# Patient Record
Sex: FEMALE | Race: WHITE | Employment: UNEMPLOYED | URBAN - METROPOLITAN AREA
[De-identification: names, ages, dates, MRNs, and addresses within clinical notes are randomized per-mention and may not be internally consistent; named-entity substitution may affect disease eponyms.]

---

## 2017-01-16 ENCOUNTER — NURSE ONLY (OUTPATIENT)
Dept: FAMILY MEDICINE CLINIC | Facility: CLINIC | Age: 52
End: 2017-01-16

## 2017-01-16 VITALS
SYSTOLIC BLOOD PRESSURE: 132 MMHG | OXYGEN SATURATION: 98 % | TEMPERATURE: 98 F | WEIGHT: 194 LBS | RESPIRATION RATE: 20 BRPM | BODY MASS INDEX: 39 KG/M2 | HEART RATE: 80 BPM | DIASTOLIC BLOOD PRESSURE: 80 MMHG

## 2017-01-16 DIAGNOSIS — R30.0 DYSURIA: Primary | ICD-10-CM

## 2017-01-16 LAB
APPEARANCE: CLEAR
MULTISTIX LOT#: NORMAL NUMERIC
PH, URINE: 6 (ref 4.5–8)
SPECIFIC GRAVITY: >=1.03 (ref 1–1.03)
URINE-COLOR: YELLOW
UROBILINOGEN,SEMI-QN: 0.2 MG/DL (ref 0–1.9)

## 2017-01-16 PROCEDURE — 81003 URINALYSIS AUTO W/O SCOPE: CPT | Performed by: PHYSICIAN ASSISTANT

## 2017-01-16 PROCEDURE — 99213 OFFICE O/P EST LOW 20 MIN: CPT | Performed by: PHYSICIAN ASSISTANT

## 2017-01-16 PROCEDURE — 87086 URINE CULTURE/COLONY COUNT: CPT | Performed by: PHYSICIAN ASSISTANT

## 2017-01-16 RX ORDER — PHENAZOPYRIDINE HYDROCHLORIDE 200 MG/1
200 TABLET, FILM COATED ORAL 3 TIMES DAILY PRN
Qty: 6 TABLET | Refills: 0 | Status: SHIPPED | OUTPATIENT
Start: 2017-01-16 | End: 2017-01-18

## 2017-01-16 NOTE — PROGRESS NOTES
CHIEF COMPLAINT:   Patient presents with:  UTI: x 1 day      HPI:   Clarice James is a 49year old female who presents with symptoms of UTI. Complaining of urinary frequency and dysuria for last 1 days. Symptoms have been stable since onset.   Treatments KETONES (URINE DIPSTICK) neg Negative mg/dL   SPECIFIC GRAVITY >=1.030 1.005 - 1.030   OCCULT BLOOD neg Negative   PH, URINE 6.0 4.5 - 8.0   PROTEIN (URINE DIPSTICK) neg Negative/Trace mg/dL   UROBILINOGEN,SEMI-QN 0.2 0.0 - 1.9 mg/dL   NITRITE, URINE neg N The urethra is the tube that allows urine to pass out of the body. In a woman, the urethra is the opening above the vagina. In men, the urethra is the opening on the tip of the penis.  Dysuria is the feeling of pain or burning in the urethra when passing ur · Contact your healthcare provider or go to an urgent care clinic or the public health department to be looked at and treated.   · Don't have sex until both you and your partner(s) have finished all antibiotics and your healthcare provider says you are no l

## 2017-01-16 NOTE — PATIENT INSTRUCTIONS
1.   Preliminary urinalysis is negative. Urine culture pending   2. Encourage fluids, avoid caffeine. Further treatment pending results of urine culture. 3.   Pyridium 200 mg up to three times daily as needed for irritation while emptying bladder. · If a culture was taken, don't have sex until you have been told that it is negative. This means you don't have an infection. Then follow your healthcare provider's advice to treat your condition.   If a culture was done and it is positive:  · Both you and

## 2017-02-22 ENCOUNTER — OFFICE VISIT (OUTPATIENT)
Dept: FAMILY MEDICINE CLINIC | Facility: CLINIC | Age: 52
End: 2017-02-22

## 2017-02-22 VITALS
DIASTOLIC BLOOD PRESSURE: 68 MMHG | OXYGEN SATURATION: 98 % | HEIGHT: 59 IN | RESPIRATION RATE: 17 BRPM | BODY MASS INDEX: 38.58 KG/M2 | HEART RATE: 86 BPM | TEMPERATURE: 98 F | WEIGHT: 191.38 LBS | SYSTOLIC BLOOD PRESSURE: 108 MMHG

## 2017-02-22 DIAGNOSIS — H66.003 ACUTE SUPPURATIVE OTITIS MEDIA OF BOTH EARS WITHOUT SPONTANEOUS RUPTURE OF TYMPANIC MEMBRANES, RECURRENCE NOT SPECIFIED: ICD-10-CM

## 2017-02-22 DIAGNOSIS — J01.00 ACUTE NON-RECURRENT MAXILLARY SINUSITIS: Primary | ICD-10-CM

## 2017-02-22 PROCEDURE — 99213 OFFICE O/P EST LOW 20 MIN: CPT | Performed by: PHYSICIAN ASSISTANT

## 2017-02-22 RX ORDER — AMOXICILLIN AND CLAVULANATE POTASSIUM 875; 125 MG/1; MG/1
1 TABLET, FILM COATED ORAL 2 TIMES DAILY
Qty: 20 TABLET | Refills: 0 | Status: SHIPPED | OUTPATIENT
Start: 2017-02-22 | End: 2017-03-04

## 2017-02-22 NOTE — PATIENT INSTRUCTIONS
-Cool mist humidifier at night  -Warm tea with honey  -Sudafed  -Flonase  -Motrin for pain or fever            Middle Ear Infection (Adult)  You have an infection of the middle ear, the space behind the eardrum.  This is also called acute otitis media (AOM) © 9490-9998 The [address] [place] [address], [address] [place]. All rights reserved. This information is not intended as a substitute for professional medical care. Always follow your healthcare professional's instructions.         Acute B · Antibiotic medicine. This is for symptoms that last for at least 10 to 14 days. · Nasal corticosteroid medicine. Drops or spray used in the nose can lessen swelling and congestion. · Over-the-counter pain medicine.  This is to lessen sinus pain and pres

## 2017-02-22 NOTE — PROGRESS NOTES
CHIEF COMPLAINT:   Patient presents with:  Sinus Problem      HPI:   Anabel Del Castillo is a 49year old female who presents for sinus sxs for  4-5 days. Sxs for 4-5 days.   Admits to chills, sweats, nasal congestion-clear, sinus pain pressure- maxillary EARS: Left TM minimally erythematous, minimal bulging, no retraction, +fluid, bony landmarks obscurred.  Left EAC WNL.  Right TM not erythematous, no bulging, no retraction, + fluid, bony landmarks obscurred.  Right EAC WNL.      NOSE: Nostrils patent, no n You have an infection of the middle ear, the space behind the eardrum. This is also called acute otitis media (AOM). Sometimes it is caused by the common cold.  This is because congestion can block the internal passage (eustachian tube) that drains fluid fr Acute bacterial rhinosinusitis (ABRS) is an infection of your nasal cavity and sinuses. It’s caused by bacteria. Acute means that you’ve had symptoms for less than 12 weeks.   Understanding your sinuses  The nasal cavity is the large air-filled space behind · Nasal decongestant medicine. Spray or drops may help to lessen congestion. Do not use them for more than a few days. · Salt wash (saline irrigation). This can help to loosen mucus.   Possible complications of ABRS  ABRS may come back or become long-term

## 2017-04-04 ENCOUNTER — OFFICE VISIT (OUTPATIENT)
Dept: FAMILY MEDICINE CLINIC | Facility: CLINIC | Age: 52
End: 2017-04-04

## 2017-04-04 VITALS
TEMPERATURE: 98 F | BODY MASS INDEX: 38.91 KG/M2 | WEIGHT: 193 LBS | HEIGHT: 59 IN | DIASTOLIC BLOOD PRESSURE: 88 MMHG | SYSTOLIC BLOOD PRESSURE: 118 MMHG

## 2017-04-04 DIAGNOSIS — J40 BRONCHITIS: Primary | ICD-10-CM

## 2017-04-04 PROCEDURE — 99213 OFFICE O/P EST LOW 20 MIN: CPT | Performed by: FAMILY MEDICINE

## 2017-04-04 RX ORDER — PREDNISONE 20 MG/1
20 TABLET ORAL 2 TIMES DAILY
Qty: 10 TABLET | Refills: 0 | Status: SHIPPED | OUTPATIENT
Start: 2017-04-04 | End: 2017-04-11

## 2017-04-04 NOTE — PATIENT INSTRUCTIONS
REST,FLUIDS,ADVIL / TYLENOL PRN fever / body aches  CALL NO CHANGE WORSENING  DISCUSSED WARNING SIGNS  F/U No change 2-3 days

## 2017-04-04 NOTE — PROGRESS NOTES
HPI:    Patient ID: Milton Giles is a 49year old female. Cough on Friday    HPI    Review of Systems   Constitutional: Negative for fever and chills. Respiratory: Positive for cough. Negative for shortness of breath.                Current Outpatient

## 2017-06-06 ENCOUNTER — PATIENT MESSAGE (OUTPATIENT)
Dept: FAMILY MEDICINE CLINIC | Facility: CLINIC | Age: 52
End: 2017-06-06

## 2017-06-06 NOTE — TELEPHONE ENCOUNTER
Oral benadryl may help although can make you drowsy,topical hydrocortisone would be better then topical benadryl  , ? Any new medications, have you been in the garden, new soaps or lotions?  If no improvement would follow up with Dr Chyna Ibarra

## 2017-06-06 NOTE — TELEPHONE ENCOUNTER
From: Sahara Lao  To: Audie Ocasio DO  Sent: 6/6/2017 9:53 AM CDT  Subject: Non-Urgent Medical Question    I just got back from vacation and now have a rash that is bumpy from my wrist to the bend in my arm.  It itches so I have been putting benadryl c

## 2017-06-07 ENCOUNTER — OFFICE VISIT (OUTPATIENT)
Dept: FAMILY MEDICINE CLINIC | Facility: CLINIC | Age: 52
End: 2017-06-07

## 2017-06-07 VITALS
OXYGEN SATURATION: 98 % | WEIGHT: 193 LBS | SYSTOLIC BLOOD PRESSURE: 130 MMHG | DIASTOLIC BLOOD PRESSURE: 88 MMHG | BODY MASS INDEX: 39 KG/M2 | RESPIRATION RATE: 16 BRPM | HEART RATE: 80 BPM | TEMPERATURE: 98 F

## 2017-06-07 DIAGNOSIS — L73.9 FOLLICULITIS: Primary | ICD-10-CM

## 2017-06-07 DIAGNOSIS — L30.9 DERMATITIS: ICD-10-CM

## 2017-06-07 PROCEDURE — 99213 OFFICE O/P EST LOW 20 MIN: CPT | Performed by: PHYSICIAN ASSISTANT

## 2017-06-07 RX ORDER — MOMETASONE FUROATE 1 MG/G
CREAM TOPICAL
Qty: 60 G | Refills: 1 | Status: SHIPPED | OUTPATIENT
Start: 2017-06-07 | End: 2017-09-16 | Stop reason: ALTCHOICE

## 2017-06-07 RX ORDER — SULFAMETHOXAZOLE AND TRIMETHOPRIM 800; 160 MG/1; MG/1
1 TABLET ORAL 2 TIMES DAILY
Qty: 20 TABLET | Refills: 0 | Status: SHIPPED | OUTPATIENT
Start: 2017-06-07 | End: 2017-06-17

## 2017-06-07 NOTE — PATIENT INSTRUCTIONS
1.  Bactrim DS 1 tablet twice daily for 10 days. May discontinue after 7 days if symptoms completely resolved. 2.  Elocon cream as prescribed once or twice daily as needed for itching. Folliculitis  Folliculitis is an infection of a hair follicle. · If the sores leak fluid, cover the area with a nonstick gauze bandage. Use as little tape as possible. Carefully discard all soiled bandages. · Dress in loose cotton clothing. · Change sheets and blankets if they are soiled by pus.  Wash all clothes, to

## 2017-08-30 ENCOUNTER — OFFICE VISIT (OUTPATIENT)
Dept: FAMILY MEDICINE CLINIC | Facility: CLINIC | Age: 52
End: 2017-08-30

## 2017-08-30 VITALS
TEMPERATURE: 98 F | SYSTOLIC BLOOD PRESSURE: 118 MMHG | HEART RATE: 80 BPM | DIASTOLIC BLOOD PRESSURE: 80 MMHG | OXYGEN SATURATION: 98 %

## 2017-08-30 DIAGNOSIS — J30.2 ACUTE SEASONAL ALLERGIC RHINITIS DUE TO OTHER ALLERGEN: ICD-10-CM

## 2017-08-30 DIAGNOSIS — J00 ACUTE NASOPHARYNGITIS: Primary | ICD-10-CM

## 2017-08-30 PROCEDURE — 99213 OFFICE O/P EST LOW 20 MIN: CPT | Performed by: PHYSICIAN ASSISTANT

## 2017-08-30 RX ORDER — AMOXICILLIN AND CLAVULANATE POTASSIUM 875; 125 MG/1; MG/1
1 TABLET, FILM COATED ORAL 2 TIMES DAILY
Qty: 20 TABLET | Refills: 0 | Status: SHIPPED | OUTPATIENT
Start: 2017-08-30 | End: 2017-09-09

## 2017-08-30 RX ORDER — FLUTICASONE PROPIONATE 50 MCG
2 SPRAY, SUSPENSION (ML) NASAL DAILY
Qty: 1 BOTTLE | Refills: 1 | Status: SHIPPED | OUTPATIENT
Start: 2017-08-30 | End: 2018-08-25

## 2017-08-30 NOTE — PATIENT INSTRUCTIONS
1.  Continue Allegra. Start Flonase. 2 sprays in each nostril daily, or 1 spray in each nostril twice daily.   If you develop a nosebleed, stop medication and restart at half the dose (1 spray in each nostril daily) after you have not had a nosebleed for · If you cannot avoid having a pet, keep it out of your bedroom and off upholstered furniture. Pollen:  · When pollen counts are high, keep windows of your car and home closed. If possible, use an air conditioner instead.   · Wear a filter mask when mowing You have a viral upper respiratory illness (URI), which is another term for the common cold. This illness is contagious during the first few days. It is spread through the air by coughing and sneezing.  It may also be spread by direct contact (touching the · Cough with lots of colored sputum (mucus)  · Severe headache; face, neck, or ear pain  · Difficulty swallowing due to throat pain  · Fever of 100.4°F (38°C)  Call 911, or get immediate medical care  Call emergency services right away if any of these occu

## 2017-08-30 NOTE — PROGRESS NOTES
CHIEF COMPLAINT:   Patient presents with:  URI: x 5 days      HPI:   Vivek Cooper is a 47year old female who presents for upper respiratory symptoms for  5 days. Patient reports congestion, dry cough, sinus pain. Low grade temp, Tmax 100 last night.   A NOSE: Nostrils patent, clear nasal discharge, nasal mucosa edematous/erythematous   THROAT: Oral mucosa pink, moist. Posterior pharynx is mildly erythematous. without exudates or hypertrophy.  (+) clear post nasal drainage.    NECK: Supple, non-tender  LUNG 2.  Encourage fluids, humidifier/vaporizor at bedside, elevate head of bed (sleep with extra pillow), vapor rub to chest, steam therapy if no fever, warm compresses for sinus pressure if no fever, salt water gargles for sore throat, lozenges for sore throa · Cover the mattress, box spring, and pillows with allergy covers.   · If possible, sleep in a room with no carpet, curtains, or upholstered furniture. Cockroaches:  · Store food in sealed containers. · Remove garbage from the home promptly.   · Fix water You have a viral upper respiratory illness (URI), which is another term for the common cold. This illness is contagious during the first few days. It is spread through the air by coughing and sneezing.  It may also be spread by direct contact (touching the · Cough with lots of colored sputum (mucus)  · Severe headache; face, neck, or ear pain  · Difficulty swallowing due to throat pain  · Fever of 100.4°F (38°C)  Call 911, or get immediate medical care  Call emergency services right away if any of these occu

## 2017-09-13 ENCOUNTER — PATIENT MESSAGE (OUTPATIENT)
Dept: FAMILY MEDICINE CLINIC | Facility: CLINIC | Age: 52
End: 2017-09-13

## 2017-09-13 NOTE — TELEPHONE ENCOUNTER
From: Yunier Espinoza  To: Diya Sample, DO  Sent: 9/13/2017 1:52 PM CDT  Subject: Prescription Question    I have been to the walk in clinic in [facility] 2 weeks ago today with a sinus virus gave me a prescription if needed well I needed it.  I took all of

## 2017-09-13 NOTE — TELEPHONE ENCOUNTER
Patient notified and verbalized understanding of the information provided  patient scheduled at her earliest convenience.  Sooner appointments were offered and declined

## 2017-09-16 ENCOUNTER — OFFICE VISIT (OUTPATIENT)
Dept: FAMILY MEDICINE CLINIC | Facility: CLINIC | Age: 52
End: 2017-09-16

## 2017-09-16 VITALS
HEART RATE: 75 BPM | BODY MASS INDEX: 38 KG/M2 | WEIGHT: 190 LBS | DIASTOLIC BLOOD PRESSURE: 78 MMHG | SYSTOLIC BLOOD PRESSURE: 106 MMHG | TEMPERATURE: 98 F

## 2017-09-16 DIAGNOSIS — H65.01 RIGHT ACUTE SEROUS OTITIS MEDIA, RECURRENCE NOT SPECIFIED: Primary | ICD-10-CM

## 2017-09-16 PROCEDURE — 99213 OFFICE O/P EST LOW 20 MIN: CPT | Performed by: FAMILY MEDICINE

## 2017-09-16 RX ORDER — PREDNISONE 20 MG/1
20 TABLET ORAL 2 TIMES DAILY
Qty: 10 TABLET | Refills: 0 | Status: SHIPPED | OUTPATIENT
Start: 2017-09-16 | End: 2017-09-23

## 2017-09-16 RX ORDER — AZITHROMYCIN 250 MG/1
TABLET, FILM COATED ORAL
Qty: 6 TABLET | Refills: 0 | Status: SHIPPED | OUTPATIENT
Start: 2017-09-16 | End: 2017-11-25

## 2017-09-16 NOTE — PROGRESS NOTES
HPI:    Patient ID: Donal Will is a 47year old female. X 2 wks sinus, R ear clogged - 1 wk  + pricila  HPI    Review of Systems   Constitutional: Negative for chills and fever. HENT: Positive for congestion and hearing loss.     Respiratory: Negative f

## 2017-11-25 ENCOUNTER — OFFICE VISIT (OUTPATIENT)
Dept: FAMILY MEDICINE CLINIC | Facility: CLINIC | Age: 52
End: 2017-11-25

## 2017-11-25 VITALS
OXYGEN SATURATION: 98 % | RESPIRATION RATE: 20 BRPM | SYSTOLIC BLOOD PRESSURE: 104 MMHG | DIASTOLIC BLOOD PRESSURE: 78 MMHG | HEART RATE: 92 BPM | TEMPERATURE: 98 F

## 2017-11-25 DIAGNOSIS — J40 BRONCHITIS: ICD-10-CM

## 2017-11-25 DIAGNOSIS — H69.81 EUSTACHIAN TUBE DYSFUNCTION, RIGHT: Primary | ICD-10-CM

## 2017-11-25 PROCEDURE — 99213 OFFICE O/P EST LOW 20 MIN: CPT | Performed by: NURSE PRACTITIONER

## 2017-11-25 RX ORDER — PREDNISONE 20 MG/1
20 TABLET ORAL DAILY
Qty: 5 TABLET | Refills: 0 | Status: SHIPPED | OUTPATIENT
Start: 2017-11-25 | End: 2017-11-30

## 2017-11-25 RX ORDER — AMOXICILLIN AND CLAVULANATE POTASSIUM 875; 125 MG/1; MG/1
1 TABLET, FILM COATED ORAL 2 TIMES DAILY
Qty: 20 TABLET | Refills: 0 | Status: SHIPPED | OUTPATIENT
Start: 2017-11-25 | End: 2017-12-05

## 2017-11-25 RX ORDER — CODEINE PHOSPHATE AND GUAIFENESIN 10; 100 MG/5ML; MG/5ML
10 SOLUTION ORAL EVERY 6 HOURS PRN
Qty: 120 ML | Refills: 0 | Status: SHIPPED | OUTPATIENT
Start: 2017-11-25 | End: 2018-09-26 | Stop reason: ALTCHOICE

## 2017-11-25 NOTE — PATIENT INSTRUCTIONS
Stay on allergy medications  Use new prescriptions        What Is Acute Bronchitis? Acute bronchitis is when the airways in your lungs (bronchial tubes) become red and swollen (inflamed). It is usually caused by a viral infection.  But it can also occur be · A nasal or throat swab. This tests to see if you have a bacterial infection. · A chest X-ray. This is done if your healthcare provider thinks you have pneumonia. · Tests to check for an underlying condition.  Other tests may be done to check for things · Take all of the medicine. Take the medicine until it is used up, even if symptoms have improved. If you don’t, the bronchitis may come back. · Take the medicines as directed. For instance, some medicines should be taken with food.   · Ask about side effe

## 2017-11-25 NOTE — PROGRESS NOTES
CHIEF COMPLAINT:   Patient presents with:  URI      HPI:   Sakshi Kelly is a 47year old female who presents for upper respiratory symptoms for  5 days. Patient reports congestion, cough with clear colored sputum, prior history of bronchitis.  Symptoms ha LUNGS: clear to auscultation bilaterally, no wheezes or rhonchi. Breathing is non labored.   CARDIO: RRR without murmur  GI: active BS's x4,no masses, hepatosplenomegaly, or tenderness on direct palpation  EXTREMITIES: no cyanosis, clubbing or edema  LYMPH: Healthy cilia: Tiny, hairlike cilia sweep mucus and particles up and out of the airways. Lungs with bronchitis  Bronchitis often occurs with a cold or the flu virus.  The airways become inflamed (red and swollen). There is a deep hacking cough from the ex · Use a humidifier. Or try breathing in steam from a hot shower. This may help loosen mucus. · Drink a lot of water and juice. They can soothe the throat and may help thin mucus. · Sit up or use extra pillows when in bed.  This helps to lessen coughing an · Wash your hands often. This helps reduce the chance of picking up viruses that cause colds and flu.   Call your healthcare provider if:  · Symptoms worsen, or you have new symptoms  · Breathing problems worsen or  become severe  · Symptoms don’t get janie

## 2018-09-26 ENCOUNTER — OFFICE VISIT (OUTPATIENT)
Dept: FAMILY MEDICINE CLINIC | Facility: CLINIC | Age: 53
End: 2018-09-26

## 2018-09-26 VITALS
SYSTOLIC BLOOD PRESSURE: 128 MMHG | BODY MASS INDEX: 39.11 KG/M2 | RESPIRATION RATE: 12 BRPM | WEIGHT: 194 LBS | TEMPERATURE: 97 F | HEIGHT: 59 IN | HEART RATE: 96 BPM | DIASTOLIC BLOOD PRESSURE: 88 MMHG

## 2018-09-26 DIAGNOSIS — R82.81 PYURIA: Primary | ICD-10-CM

## 2018-09-26 LAB
APPEARANCE: CLEAR
MULTISTIX LOT#: NORMAL NUMERIC
PH, URINE: 5.5 (ref 4.5–8)
SPECIFIC GRAVITY: 1.02 (ref 1–1.03)
URINE-COLOR: YELLOW
UROBILINOGEN,SEMI-QN: 0.2 MG/DL (ref 0–1.9)

## 2018-09-26 PROCEDURE — 87086 URINE CULTURE/COLONY COUNT: CPT | Performed by: FAMILY MEDICINE

## 2018-09-26 PROCEDURE — 81003 URINALYSIS AUTO W/O SCOPE: CPT | Performed by: FAMILY MEDICINE

## 2018-09-26 PROCEDURE — 99213 OFFICE O/P EST LOW 20 MIN: CPT | Performed by: FAMILY MEDICINE

## 2018-09-26 RX ORDER — CEPHALEXIN 500 MG/1
500 CAPSULE ORAL 2 TIMES DAILY
Qty: 14 CAPSULE | Refills: 0 | Status: SHIPPED | OUTPATIENT
Start: 2018-09-26 | End: 2019-05-13 | Stop reason: ALTCHOICE

## 2018-09-26 NOTE — PROGRESS NOTES
HPI:    Patient ID: Sahara Lao is a 47year old female. X 2 days  Bladder cramping / freq    HPI    Review of Systems   Constitutional: Positive for chills. Negative for fever. HENT: Negative.     Respiratory: Negative for cough and shortness of papi

## 2019-02-09 NOTE — TELEPHONE ENCOUNTER
My chart message sent [Fever] : fever [Nasal Discharge] : nasal discharge [Nasal Congestion] : nasal congestion [Sore Throat] : sore throat [Cough] : cough [Dizziness] : dizziness [Negative] : Genitourinary

## 2019-03-19 ENCOUNTER — PATIENT OUTREACH (OUTPATIENT)
Dept: FAMILY MEDICINE CLINIC | Facility: CLINIC | Age: 54
End: 2019-03-19

## 2019-05-13 ENCOUNTER — OFFICE VISIT (OUTPATIENT)
Dept: FAMILY MEDICINE CLINIC | Facility: CLINIC | Age: 54
End: 2019-05-13

## 2019-05-13 VITALS
WEIGHT: 207 LBS | TEMPERATURE: 97 F | SYSTOLIC BLOOD PRESSURE: 110 MMHG | DIASTOLIC BLOOD PRESSURE: 86 MMHG | HEIGHT: 59.5 IN | BODY MASS INDEX: 41.18 KG/M2

## 2019-05-13 DIAGNOSIS — H60.12 CELLULITIS OF LEFT EXTERNAL EAR: Primary | ICD-10-CM

## 2019-05-13 DIAGNOSIS — J40 BRONCHITIS: ICD-10-CM

## 2019-05-13 DIAGNOSIS — T78.40XA ALLERGIC REACTION, INITIAL ENCOUNTER: ICD-10-CM

## 2019-05-13 PROCEDURE — 99214 OFFICE O/P EST MOD 30 MIN: CPT | Performed by: FAMILY MEDICINE

## 2019-05-13 RX ORDER — CEPHALEXIN 500 MG/1
500 CAPSULE ORAL 3 TIMES DAILY
Qty: 30 CAPSULE | Refills: 0 | Status: SHIPPED | OUTPATIENT
Start: 2019-05-13 | End: 2019-05-23 | Stop reason: ALTCHOICE

## 2019-05-13 RX ORDER — PREDNISONE 20 MG/1
20 TABLET ORAL 2 TIMES DAILY
Qty: 10 TABLET | Refills: 0 | Status: SHIPPED | OUTPATIENT
Start: 2019-05-13 | End: 2019-05-18

## 2019-05-13 NOTE — PROGRESS NOTES
HPI:    Patient ID: Shell Monet is a 47year old female. L ear swelling yest  Ear drops - 1 wk ago left ear. Swelling to left ear starting yesterday. Itching. Without drainage. Complaints of head congestion. Cough. X5 days.   Without fever, chills cephALEXin 500 MG Oral Cap 30 capsule 0     Sig: Take 1 capsule (500 mg total) by mouth 3 (three) times daily.        Imaging & Referrals:  None       #[id number]

## 2019-05-13 NOTE — PATIENT INSTRUCTIONS
1% hydrocortisone cream and topical.  Cool \"Compresses. Medications as directed. Call with questions or problems.   REST,FLUIDS,ADVIL / TYLENOL PRN fever / body aches  CALL NO CHANGE WORSENING  DISCUSSED WARNING SIGNS  F/U No change 2-3 days

## 2019-05-23 ENCOUNTER — OFFICE VISIT (OUTPATIENT)
Dept: FAMILY MEDICINE CLINIC | Facility: CLINIC | Age: 54
End: 2019-05-23

## 2019-05-23 VITALS
WEIGHT: 199.13 LBS | DIASTOLIC BLOOD PRESSURE: 80 MMHG | OXYGEN SATURATION: 99 % | HEART RATE: 98 BPM | BODY MASS INDEX: 40 KG/M2 | TEMPERATURE: 100 F | SYSTOLIC BLOOD PRESSURE: 130 MMHG

## 2019-05-23 DIAGNOSIS — T78.40XA ALLERGIC REACTION, INITIAL ENCOUNTER: Primary | ICD-10-CM

## 2019-05-23 DIAGNOSIS — L30.9 DERMATITIS: ICD-10-CM

## 2019-05-23 PROCEDURE — 99213 OFFICE O/P EST LOW 20 MIN: CPT | Performed by: FAMILY MEDICINE

## 2019-05-23 RX ORDER — MOMETASONE FUROATE 1 MG/G
CREAM TOPICAL
Qty: 60 G | Refills: 1 | Status: SHIPPED | OUTPATIENT
Start: 2019-05-23 | End: 2019-09-11

## 2019-05-23 RX ORDER — PREDNISONE 20 MG/1
20 TABLET ORAL 2 TIMES DAILY
Qty: 14 TABLET | Refills: 0 | Status: SHIPPED | OUTPATIENT
Start: 2019-05-23 | End: 2019-05-30

## 2019-05-23 NOTE — PROGRESS NOTES
HPI:    Patient ID: Alysa Barbosa is a 47year old female. Pt itching / rash L ear / neck  W/o pain  W/o rash elsewhere     HPI    Review of Systems   Constitutional: Negative for chills and fever. Skin: Positive for rash.               Current Outpatie

## 2019-09-11 ENCOUNTER — PATIENT MESSAGE (OUTPATIENT)
Dept: FAMILY MEDICINE CLINIC | Facility: CLINIC | Age: 54
End: 2019-09-11

## 2019-09-11 DIAGNOSIS — T78.40XA ALLERGIC REACTION, INITIAL ENCOUNTER: ICD-10-CM

## 2019-09-11 RX ORDER — MOMETASONE FUROATE 1 MG/G
CREAM TOPICAL
Qty: 60 G | Refills: 1 | Status: SHIPPED | OUTPATIENT
Start: 2019-09-11

## 2019-09-11 NOTE — TELEPHONE ENCOUNTER
From: Jose Ward  To: Serenity Current, DO  Sent: 9/11/2019 10:16 AM CDT  Subject: Non-Urgent Medical Question    Can you please call in the steroid cream to [facility].  Thank you

## 2019-10-09 ENCOUNTER — OFFICE VISIT (OUTPATIENT)
Dept: FAMILY MEDICINE CLINIC | Facility: CLINIC | Age: 54
End: 2019-10-09

## 2019-10-09 VITALS
RESPIRATION RATE: 18 BRPM | TEMPERATURE: 97 F | SYSTOLIC BLOOD PRESSURE: 138 MMHG | HEART RATE: 86 BPM | HEIGHT: 59.5 IN | DIASTOLIC BLOOD PRESSURE: 80 MMHG | WEIGHT: 200 LBS | OXYGEN SATURATION: 97 % | BODY MASS INDEX: 39.79 KG/M2

## 2019-10-09 DIAGNOSIS — J20.9 ACUTE BRONCHITIS, UNSPECIFIED ORGANISM: Primary | ICD-10-CM

## 2019-10-09 PROCEDURE — 99213 OFFICE O/P EST LOW 20 MIN: CPT | Performed by: NURSE PRACTITIONER

## 2019-10-09 RX ORDER — PREDNISONE 20 MG/1
20 TABLET ORAL 2 TIMES DAILY
Qty: 10 TABLET | Refills: 0 | Status: SHIPPED | OUTPATIENT
Start: 2019-10-09 | End: 2019-10-14

## 2019-10-09 NOTE — PROGRESS NOTES
HPI:   Cough   This is a new problem. Episode onset: over the weekend. Associated symptoms include chills, rhinorrhea (slightly), shortness of breath (sometimes) and wheezing (a little bit).  Pertinent negatives include no chest pain, ear pain, fever, thomas Constitutional: She appears well-developed and well-nourished. No distress.    HENT:   Right Ear: Tympanic membrane and ear canal normal.   Left Ear: Tympanic membrane and ear canal normal.   Nose: Nose normal.   Mouth/Throat: Oropharynx is clear and mois

## 2019-10-10 ENCOUNTER — TELEPHONE (OUTPATIENT)
Dept: FAMILY MEDICINE CLINIC | Facility: CLINIC | Age: 54
End: 2019-10-10

## 2019-10-18 ENCOUNTER — PATIENT MESSAGE (OUTPATIENT)
Dept: FAMILY MEDICINE CLINIC | Facility: CLINIC | Age: 54
End: 2019-10-18

## 2019-10-18 RX ORDER — AZITHROMYCIN 250 MG/1
TABLET, FILM COATED ORAL
Qty: 6 TABLET | Refills: 0 | Status: SHIPPED | OUTPATIENT
Start: 2019-10-18 | End: 2020-04-30 | Stop reason: ALTCHOICE

## 2019-10-18 NOTE — TELEPHONE ENCOUNTER
From: Dorothy Sheehan  To: Ranulfo Vazquez DO  Sent: 10/18/2019 11:05 AM CDT  Subject: Visit Follow-up Question    I was in to see the nurse practitioner last week and she said to let her know if my symptoms don't get better.  I have no insurance so I wondered

## 2019-10-19 NOTE — TELEPHONE ENCOUNTER
From: Jarek Roth  To: An Pearson DO  Sent: 10/18/2019 1:43 PM CDT  Subject: Visit Follow-up Question    Okay I will thank you

## 2020-04-30 ENCOUNTER — PATIENT MESSAGE (OUTPATIENT)
Dept: FAMILY MEDICINE CLINIC | Facility: CLINIC | Age: 55
End: 2020-04-30

## 2020-04-30 ENCOUNTER — VIRTUAL PHONE E/M (OUTPATIENT)
Dept: FAMILY MEDICINE CLINIC | Facility: CLINIC | Age: 55
End: 2020-04-30

## 2020-04-30 DIAGNOSIS — J01.00 ACUTE MAXILLARY SINUSITIS, RECURRENCE NOT SPECIFIED: Primary | ICD-10-CM

## 2020-04-30 PROCEDURE — 99442 PHONE E/M BY PHYS 11-20 MIN: CPT | Performed by: FAMILY MEDICINE

## 2020-04-30 RX ORDER — AZITHROMYCIN 250 MG/1
TABLET, FILM COATED ORAL
Qty: 6 TABLET | Refills: 0 | Status: SHIPPED | OUTPATIENT
Start: 2020-04-30 | End: 2020-05-05

## 2020-04-30 RX ORDER — PREDNISONE 20 MG/1
40 TABLET ORAL DAILY
Qty: 10 TABLET | Refills: 0 | Status: SHIPPED | OUTPATIENT
Start: 2020-04-30 | End: 2020-05-05

## 2020-04-30 NOTE — PATIENT INSTRUCTIONS
REST,FLUIDS,ADVIL / TYLENOL PRN fever / body aches  CALL NO CHANGE WORSENING  DISCUSSED WARNING SIGNS  F/U No change 2-3 days  Continue with decongestant, antihistamine.

## 2020-04-30 NOTE — TELEPHONE ENCOUNTER
Virtual/Telephone Check-In    Supriya Penny verbally consents to a Virtual/Telephone Check-In service on 04/30/20. Patient understands and accepts financial responsibility for any deductible, co-insurance and/or co-pays associated with this service.   4/3

## 2020-04-30 NOTE — TELEPHONE ENCOUNTER
From: Dorothy Sheehan  To: Ranulfo Vazquez DO  Sent: 4/30/2020 8:57 AM CDT  Subject: Non-Urgent Medical Question    A phone visit and I am available anytime.

## 2020-04-30 NOTE — PROGRESS NOTES
HPI:    Patient ID: Jad Rose is a 49year old female. Telephone visit. 12 minutes. Pt x6 days with complaints of head congestion, sinus pressure, postnasal drip. Rare cough. Without fever. Positive fatigue.   Using Sudafed decongestant with some

## 2020-04-30 NOTE — TELEPHONE ENCOUNTER
From: Milton Giles  To: Mesfin Rand DO  Sent: 4/30/2020 8:30 AM CDT  Subject: Non-Urgent Medical Question    I am pretty sure I have a sinus infection and don't want to go into the office.  Is it possible for Dr. Liliana Sahni to send over a prescription to [facility]

## 2020-08-17 ENCOUNTER — DOCUMENTATION ONLY (OUTPATIENT)
Dept: FAMILY MEDICINE CLINIC | Facility: CLINIC | Age: 55
End: 2020-08-17

## 2020-10-14 ENCOUNTER — TELEPHONE (OUTPATIENT)
Dept: FAMILY MEDICINE CLINIC | Facility: CLINIC | Age: 55
End: 2020-10-14

## 2020-10-19 ENCOUNTER — TELEPHONE (OUTPATIENT)
Dept: FAMILY MEDICINE CLINIC | Facility: CLINIC | Age: 55
End: 2020-10-19

## 2020-10-19 DIAGNOSIS — Z12.31 ENCOUNTER FOR SCREENING MAMMOGRAM FOR MALIGNANT NEOPLASM OF BREAST: Primary | ICD-10-CM

## 2020-12-02 ENCOUNTER — PATIENT MESSAGE (OUTPATIENT)
Dept: FAMILY MEDICINE CLINIC | Facility: CLINIC | Age: 55
End: 2020-12-02

## 2020-12-02 NOTE — TELEPHONE ENCOUNTER
From: Harinder Kidd  To: Alessandra Leong DO  Sent: 12/2/2020 9:19 AM CST  Subject: Non-Urgent Medical Question    Just a little congested and dry throat. I lost my voice but it's better now.  My daughter had me worried that I might have Covid but I think it's

## 2021-02-12 ENCOUNTER — OFFICE VISIT (OUTPATIENT)
Dept: FAMILY MEDICINE CLINIC | Facility: CLINIC | Age: 56
End: 2021-02-12

## 2021-02-12 VITALS
TEMPERATURE: 97 F | BODY MASS INDEX: 40.28 KG/M2 | OXYGEN SATURATION: 98 % | SYSTOLIC BLOOD PRESSURE: 136 MMHG | DIASTOLIC BLOOD PRESSURE: 80 MMHG | WEIGHT: 202.5 LBS | HEIGHT: 59.5 IN | HEART RATE: 76 BPM

## 2021-02-12 DIAGNOSIS — R30.0 DYSURIA: Primary | ICD-10-CM

## 2021-02-12 LAB
APPEARANCE: CLEAR
BILIRUBIN: NEGATIVE
GLUCOSE (URINE DIPSTICK): NEGATIVE MG/DL
KETONES (URINE DIPSTICK): NEGATIVE MG/DL
MULTISTIX LOT#: 5077 NUMERIC
NITRITE, URINE: NEGATIVE
OCCULT BLOOD: NEGATIVE
PH, URINE: 5.5 (ref 4.5–8)
PROTEIN (URINE DIPSTICK): NEGATIVE MG/DL
SPECIFIC GRAVITY: 1.03 (ref 1–1.03)
URINE-COLOR: YELLOW
UROBILINOGEN,SEMI-QN: 0.2 MG/DL (ref 0–1.9)

## 2021-02-12 PROCEDURE — 3008F BODY MASS INDEX DOCD: CPT | Performed by: FAMILY MEDICINE

## 2021-02-12 PROCEDURE — 99212 OFFICE O/P EST SF 10 MIN: CPT | Performed by: FAMILY MEDICINE

## 2021-02-12 PROCEDURE — 3079F DIAST BP 80-89 MM HG: CPT | Performed by: FAMILY MEDICINE

## 2021-02-12 PROCEDURE — 81003 URINALYSIS AUTO W/O SCOPE: CPT | Performed by: FAMILY MEDICINE

## 2021-02-12 PROCEDURE — 3075F SYST BP GE 130 - 139MM HG: CPT | Performed by: FAMILY MEDICINE

## 2021-02-12 RX ORDER — CIPROFLOXACIN 250 MG/1
250 TABLET, FILM COATED ORAL 2 TIMES DAILY
Qty: 14 TABLET | Refills: 0 | Status: SHIPPED | OUTPATIENT
Start: 2021-02-12 | End: 2021-02-19

## 2021-02-12 NOTE — PROGRESS NOTES
HPI:    Patient ID: Keith Powell is a 48year old female. X 1 wk - + freq  + dysuria  HPI    Review of Systems   Constitutional: Negative for chills and fever. Gastrointestinal: Negative.     Genitourinary: Negative for vaginal discharge and vaginal pa

## 2021-02-16 ENCOUNTER — PATIENT MESSAGE (OUTPATIENT)
Dept: FAMILY MEDICINE CLINIC | Facility: CLINIC | Age: 56
End: 2021-02-16

## 2021-02-17 RX ORDER — CEPHALEXIN 500 MG/1
500 CAPSULE ORAL 2 TIMES DAILY
Qty: 14 CAPSULE | Refills: 0 | Status: SHIPPED | OUTPATIENT
Start: 2021-02-17 | End: 2021-02-24

## 2021-02-17 NOTE — TELEPHONE ENCOUNTER
From: Jignesh Winston  To: Valentin Yousif DO  Sent: 2/16/2021 7:46 PM CST  Subject: Visit Follow-up Question    I am still feeling pressure before I use the bathroom. Also the antibiotic were $45 so if I get on another one can it be cheaper. Thank you.

## 2021-02-25 ENCOUNTER — PATIENT MESSAGE (OUTPATIENT)
Dept: FAMILY MEDICINE CLINIC | Facility: CLINIC | Age: 56
End: 2021-02-25

## 2021-02-26 ENCOUNTER — TELEPHONE (OUTPATIENT)
Dept: FAMILY MEDICINE CLINIC | Facility: CLINIC | Age: 56
End: 2021-02-26

## 2021-02-26 ENCOUNTER — APPOINTMENT (OUTPATIENT)
Dept: CT IMAGING | Age: 56
End: 2021-02-26
Attending: NURSE PRACTITIONER

## 2021-02-26 ENCOUNTER — HOSPITAL ENCOUNTER (OUTPATIENT)
Age: 56
Discharge: HOME | End: 2021-02-26

## 2021-02-26 VITALS
DIASTOLIC BLOOD PRESSURE: 77 MMHG | OXYGEN SATURATION: 98 % | TEMPERATURE: 97 F | SYSTOLIC BLOOD PRESSURE: 143 MMHG | BODY MASS INDEX: 40.32 KG/M2 | HEART RATE: 68 BPM | WEIGHT: 200 LBS | HEIGHT: 59 IN | RESPIRATION RATE: 18 BRPM

## 2021-02-26 DIAGNOSIS — N20.0 KIDNEY STONE: ICD-10-CM

## 2021-02-26 DIAGNOSIS — R35.0 URINE FREQUENCY: Primary | ICD-10-CM

## 2021-02-26 LAB
#MXD IC: 0.4 X10ˆ3/UL (ref 0.1–1)
CREAT BLD-MCNC: 0.6 MG/DL
GLUCOSE BLD-MCNC: 91 MG/DL (ref 70–99)
HCT VFR BLD AUTO: 37.6 %
HGB BLD-MCNC: 12.2 G/DL
ISTAT BUN: 18 MG/DL (ref 7–18)
ISTAT CHLORIDE: 108 MMOL/L (ref 98–112)
ISTAT HEMATOCRIT: 37 %
ISTAT IONIZED CALCIUM FOR CHEM 8: 1.26 MMOL/L (ref 1.12–1.32)
ISTAT POTASSIUM: 3.8 MMOL/L (ref 3.6–5.1)
ISTAT SODIUM: 143 MMOL/L (ref 136–145)
ISTAT TCO2: 26 MMOL/L (ref 21–32)
LYMPHOCYTES # BLD AUTO: 2.6 X10ˆ3/UL (ref 1–4)
LYMPHOCYTES NFR BLD AUTO: 28.5 %
MCH RBC QN AUTO: 29.5 PG (ref 26–34)
MCHC RBC AUTO-ENTMCNC: 32.4 G/DL (ref 31–37)
MCV RBC AUTO: 90.8 FL (ref 80–100)
MIXED CELL %: 4 %
NEUTROPHILS # BLD AUTO: 6 X10ˆ3/UL (ref 1.5–7.7)
NEUTROPHILS NFR BLD AUTO: 67.5 %
PLATELET # BLD AUTO: 334 X10ˆ3/UL (ref 150–450)
POCT BILIRUBIN URINE: NEGATIVE
POCT GLUCOSE URINE: NEGATIVE MG/DL
POCT KETONE URINE: NEGATIVE MG/DL
POCT NITRITE URINE: NEGATIVE
POCT PH URINE: 5 (ref 5–8)
POCT PROTEIN URINE: NEGATIVE MG/DL
POCT SPECIFIC GRAVITY URINE: 1.03
POCT URINE COLOR: YELLOW
POCT UROBILINOGEN URINE: 0.2 MG/DL
RBC # BLD AUTO: 4.14 X10ˆ6/UL
WBC # BLD AUTO: 9 X10ˆ3/UL (ref 4–11)

## 2021-02-26 PROCEDURE — 99213 OFFICE O/P EST LOW 20 MIN: CPT | Performed by: NURSE PRACTITIONER

## 2021-02-26 PROCEDURE — 85025 COMPLETE CBC W/AUTO DIFF WBC: CPT | Performed by: NURSE PRACTITIONER

## 2021-02-26 PROCEDURE — 74176 CT ABD & PELVIS W/O CONTRAST: CPT | Performed by: NURSE PRACTITIONER

## 2021-02-26 PROCEDURE — 80047 BASIC METABLC PNL IONIZED CA: CPT | Performed by: NURSE PRACTITIONER

## 2021-02-26 PROCEDURE — 87086 URINE CULTURE/COLONY COUNT: CPT | Performed by: NURSE PRACTITIONER

## 2021-02-26 PROCEDURE — 81002 URINALYSIS NONAUTO W/O SCOPE: CPT | Performed by: NURSE PRACTITIONER

## 2021-02-26 PROCEDURE — 36415 COLL VENOUS BLD VENIPUNCTURE: CPT | Performed by: NURSE PRACTITIONER

## 2021-02-26 NOTE — TELEPHONE ENCOUNTER
Patient was prescribed Cipro on 2/12/2021 then cephalexin on 2/17. Please see patient's [facility] message. Thank you.

## 2021-02-26 NOTE — TELEPHONE ENCOUNTER
Marija called and we set her up an appt for a follow up but not until Monday, Marija said that the pain is getting worse and she is feeling nauseous not sure what she can do.   Please advise

## 2021-02-26 NOTE — TELEPHONE ENCOUNTER
From: Harinder Kidd  To: Alessandra Leong DO  Sent: 2/25/2021 10:16 PM CST  Subject: Non-Urgent Medical Question    I finished all of my antibiotic and I am still having the pains. It went away for a few days but it's back.

## 2021-02-26 NOTE — ED INITIAL ASSESSMENT (HPI)
Pt here c/o pressure to lower abd, frequent urination for last 2-3 weeks. Denies pain on urination. States has been on 2 different antibiotics for the symptoms.

## 2021-02-26 NOTE — TELEPHONE ENCOUNTER
Pain in lower abdomen feels like the pressure sensation is shooting into her vagina, denies hematuria, feels like she has to urinate but can only go \"a little bit\". Last BM this morning, a little on the constipated side but did not affect her pain.  Lars Garcia

## 2021-02-27 NOTE — ED PROVIDER NOTES
Patient Seen in: [facility] [address] [place] [address]      History   Patient presents with:  Urinary Symptoms    Stated Complaint: UTI    HPI/Subjective:   27-year-old female presents today with complaints of urinary frequency urgency and lower abdominal suprapubic pre Resp 18   Temp 96.5 °F (35.8 °C)   Temp src Tympanic   SpO2 98 %   O2 Device None (Room air)       Current:/74   Pulse 78   Temp 96.5 °F (35.8 °C) (Tympanic)   Resp 18   Ht 149.9 cm (4' 11\")   Wt 90.7 kg   SpO2 98%   BMI 40.40 kg/m²         Physic cranio-caudal plane. Dose reduction techniques were used. Dose information is transmitted to the  [facility]) NRDR ([address]) which includes the Dose Index Registry.   PATIENT STATED HISTORY: (As transcribed b cyst.  Contrast-enhanced study would be helpful for further evaluation as clinically indicated. There is left renal cortical thinning. 3. Umbilical hernia contains fat and may and distended transverse colon.    Dictated by (CST): Rosario Romero MD on 2/26/2021

## 2021-10-27 ENCOUNTER — PATIENT MESSAGE (OUTPATIENT)
Dept: FAMILY MEDICINE CLINIC | Facility: CLINIC | Age: 56
End: 2021-10-27

## 2021-10-27 RX ORDER — GENTAMICIN SULFATE 3 MG/ML
2 SOLUTION/ DROPS OPHTHALMIC 4 TIMES DAILY
Qty: 15 ML | Refills: 0 | Status: SHIPPED | OUTPATIENT
Start: 2021-10-27 | End: 2021-10-27

## 2021-10-27 RX ORDER — GENTAMICIN SULFATE 3 MG/ML
2 SOLUTION/ DROPS OPHTHALMIC 4 TIMES DAILY
Qty: 15 ML | Refills: 0 | Status: SHIPPED | OUTPATIENT
Start: 2021-10-27 | End: 2021-11-01

## 2021-10-27 NOTE — TELEPHONE ENCOUNTER
From: Alysa Barbosa  Sent: 10/27/2021 11:30 AM CDT  To: Kellee Kenny Clinical Staff  Subject: Prescription     Can you send the prescription to [facility] in [place]?  I can’t afford what their charging at [place]

## 2021-10-27 NOTE — TELEPHONE ENCOUNTER
From: Jaquelin Martino  To: Fermin Frausto DO  Sent: 10/27/2021 7:34 AM CDT  Subject: Prescription     I was wondering if Dr Serenity Qureshi can send in a prescription. I woke up this morning with pink eye. I also have no insurance.

## 2021-11-05 ENCOUNTER — TELEPHONE (OUTPATIENT)
Dept: FAMILY MEDICINE CLINIC | Facility: CLINIC | Age: 56
End: 2021-11-05

## 2021-11-05 NOTE — TELEPHONE ENCOUNTER
Dr Trevor Masterson ordered Gentamycin OPHTH drops 10/27/2021 for red eyes; after 5 days using, no better,  She reports both eyes are red; left eye hurts; will have some crusty drainage in morning, and then eyes runny.   Discussed making appt for her; but when asked

## 2021-12-10 ENCOUNTER — TELEPHONE (OUTPATIENT)
Dept: FAMILY MEDICINE CLINIC | Facility: CLINIC | Age: 56
End: 2021-12-10

## 2021-12-10 NOTE — TELEPHONE ENCOUNTER
Vaccine is recommended. The vaccine duration of effectiveness is yet to be discovered. Some studies show only 3 to 6 months. The vaccine will decrease your risk of hospitalization. Further questions or concerns would recommend appointment.

## 2021-12-10 NOTE — TELEPHONE ENCOUNTER
This message was sent through the Customer Service pool. Marlena Hurst  Patient Customer Service Request Pool 3 days ago     Topic: <<Select One of the Topics Below>>.     I was wondering if Dr Palmer Fountain could let me know if I should get the vaccine.  My

## 2021-12-22 ENCOUNTER — OFFICE VISIT (OUTPATIENT)
Dept: FAMILY MEDICINE CLINIC | Facility: CLINIC | Age: 56
End: 2021-12-22

## 2021-12-22 VITALS
HEART RATE: 86 BPM | BODY MASS INDEX: 37.9 KG/M2 | DIASTOLIC BLOOD PRESSURE: 80 MMHG | TEMPERATURE: 99 F | WEIGHT: 188 LBS | RESPIRATION RATE: 16 BRPM | HEIGHT: 59 IN | SYSTOLIC BLOOD PRESSURE: 126 MMHG

## 2021-12-22 DIAGNOSIS — J06.9 UPPER RESPIRATORY TRACT INFECTION, UNSPECIFIED TYPE: Primary | ICD-10-CM

## 2021-12-22 PROCEDURE — 3008F BODY MASS INDEX DOCD: CPT | Performed by: FAMILY MEDICINE

## 2021-12-22 PROCEDURE — 3074F SYST BP LT 130 MM HG: CPT | Performed by: FAMILY MEDICINE

## 2021-12-22 PROCEDURE — 3079F DIAST BP 80-89 MM HG: CPT | Performed by: FAMILY MEDICINE

## 2021-12-22 PROCEDURE — 99213 OFFICE O/P EST LOW 20 MIN: CPT | Performed by: FAMILY MEDICINE

## 2021-12-22 NOTE — PROGRESS NOTES
Sakshi Kelly is a 55year old female. Patient presents with:  Cough: sick clinic      HPI:   Patient has had cough, chills. No fever. No shortness of breath or wheezing. Some sinus pressure. Her symptoms began 5 days ago.   She works as a  at [employer],in no apparent distress  SKIN: no rashes,no suspicious lesions  HEENT: atraumatic, normocephalic, R TM normal, L TM normal, Pharynx normal  NECK: supple, no cervical adenopathy  LUNGS: clear to auscultation  CARDIO: RRR without murmur  ABD s coughed, or somehow got respiratory droplets on you    Reducing the length of quarantine may make it easier for people to quarantine by reducing the time they cannot work.  A shorter quarantine period also can lessen stress on the public health system, ko 7. Wash your hands often with soap and water for at least 20 seconds or clean your hands with an alcohol-based hand  that contains at least 60% alcohol. 8. As much as possible, stay in a specific room and away from other people in your home.  A immunocompromised.   If you have a fever with cough or shortness of breath but have not been exposed to someone with COVID-19 and have not tested positive for COVID-19, you should also stay home and away from others for a total of 10 days after your symptom plasma? Potential convalescent plasma donors must:    · Have had a confirmed diagnosis of COVID-19  · Be symptom-free for at least 14 days*    *Some people will be required to have a repeat COVID-19 test in order to be eligible to donate.  If you’re inst who experience Post-COVID conditions to be random. Researchers are trying to identify similarities between people with a Post-COVID condition to better understand if there are risk factors. How do I prevent a Post-COVID condition?   The best way to pre

## 2021-12-23 ENCOUNTER — PATIENT MESSAGE (OUTPATIENT)
Dept: FAMILY MEDICINE CLINIC | Facility: CLINIC | Age: 56
End: 2021-12-23

## 2021-12-23 DIAGNOSIS — J32.9 SINUSITIS, UNSPECIFIED CHRONICITY, UNSPECIFIED LOCATION: Primary | ICD-10-CM

## 2021-12-23 RX ORDER — AZITHROMYCIN 250 MG/1
TABLET, FILM COATED ORAL
Qty: 6 TABLET | Refills: 0 | Status: SHIPPED | OUTPATIENT
Start: 2021-12-23 | End: 2021-12-23

## 2021-12-23 RX ORDER — AZITHROMYCIN 250 MG/1
TABLET, FILM COATED ORAL
Qty: 6 TABLET | Refills: 0 | Status: SHIPPED | OUTPATIENT
Start: 2021-12-23 | End: 2021-12-28

## 2021-12-23 NOTE — TELEPHONE ENCOUNTER
Script sent and emailed patient with instructions.     Script cancelled at [facility]/[place] and sent to [facility]

## 2021-12-23 NOTE — TELEPHONE ENCOUNTER
From: Sahara Lao  To: Shady Trujillo DO  Sent: 12/23/2021 7:52 AM CST  Subject: My visit yesterday     I was seen by Dr Ten Boston yesterday for symptoms that look like covid. My head is very congested and I was wondering if I could get an antibiotic.  I h

## 2021-12-31 ENCOUNTER — PATIENT MESSAGE (OUTPATIENT)
Dept: FAMILY MEDICINE CLINIC | Facility: CLINIC | Age: 56
End: 2021-12-31

## 2022-01-03 ENCOUNTER — PATIENT MESSAGE (OUTPATIENT)
Dept: FAMILY MEDICINE CLINIC | Facility: CLINIC | Age: 57
End: 2022-01-03

## 2022-01-03 NOTE — TELEPHONE ENCOUNTER
Goes along with Covid. X clear nasal spray, vitamin therapy, humidifier. Advil Cold and Sinus. Follow-up if no change or worsening on Thursday in office.

## 2022-01-03 NOTE — TELEPHONE ENCOUNTER
From: Benedicto Presybeterian  To: Melvin Springer DO  Sent: 1/3/2022 1:35 PM CST  Subject: Covid    I left a message that I tested positive for covid but I also have some severe head congestion and can’t hear out of my right ear.

## 2022-01-03 NOTE — TELEPHONE ENCOUNTER
From: [place]  To: Joseph Jean-Baptiste DO  Sent: 12/31/2021 12:40 PM CST  Subject: Covid    I just wanted to let Dr Benny Bonilla know that I tested positive for covid. Is there anything I should be doing?

## 2022-01-07 ENCOUNTER — TELEMEDICINE (OUTPATIENT)
Dept: FAMILY MEDICINE CLINIC | Facility: CLINIC | Age: 57
End: 2022-01-07

## 2022-01-07 DIAGNOSIS — U07.1 COVID-19: ICD-10-CM

## 2022-01-07 DIAGNOSIS — J01.00 ACUTE MAXILLARY SINUSITIS, RECURRENCE NOT SPECIFIED: Primary | ICD-10-CM

## 2022-01-07 PROCEDURE — 99213 OFFICE O/P EST LOW 20 MIN: CPT | Performed by: FAMILY MEDICINE

## 2022-01-07 RX ORDER — DOXYCYCLINE 100 MG/1
100 CAPSULE ORAL 2 TIMES DAILY
Qty: 20 CAPSULE | Refills: 0 | Status: SHIPPED | OUTPATIENT
Start: 2022-01-07

## 2022-01-07 RX ORDER — PREDNISONE 20 MG/1
40 TABLET ORAL DAILY
Qty: 10 TABLET | Refills: 0 | Status: SHIPPED | OUTPATIENT
Start: 2022-01-07 | End: 2022-01-12

## 2022-01-07 NOTE — PROGRESS NOTES
Subjective:   Patient ID: Aileen Montalvo is a 55year old female. Video visit. Review of chart and medications. 15 minutes. Patient states tested + December 31 in town at testing site. Started with symptoms December 29. Decreased hearing right ear. 5 days.        Imaging & Referrals:  None

## 2023-03-14 ENCOUNTER — PATIENT OUTREACH (OUTPATIENT)
Dept: FAMILY MEDICINE CLINIC | Facility: CLINIC | Age: 58
End: 2023-03-14

## 2023-08-21 ENCOUNTER — PATIENT OUTREACH (OUTPATIENT)
Dept: FAMILY MEDICINE CLINIC | Facility: CLINIC | Age: 58
End: 2023-08-21

## 2025-03-18 ENCOUNTER — PATIENT OUTREACH (OUTPATIENT)
Dept: CASE MANAGEMENT | Age: 60
End: 2025-03-18

## 2025-03-18 NOTE — PROCEDURES
The office order for PCP removal request is Approved and finalized on March 18, 2025.    Removed Serafin Hollins DO as the patient's Primary Care Physician

## (undated) NOTE — MR AVS SNAPSHOT
[address] [facility]  [place] [id number] [phone]               Thank you for choosing us for your health care visit with PATITO Zhang.   We are glad to serve you and happy to provide you with [facility] Follow up with your healthcare provider, or as advised, in 2 weeks if all symptoms have not gotten better, or if hearing doesn't go back to normal within 1 month.   When to seek medical advice  Call your healthcare provider right away if any of these occur: ABRS may be diagnosed if you’ve had an upper respiratory infection like a cold and cough for longer than 10 to 14 days. Your health care provider will ask about your symptoms and your medical history.  The provider will check your vital signs, including you Allergies as of Feb 22, 2017     No Known Allergies                   Current Medications          This list is accurate as of: 2/22/17  5:10 PM.  Always use your most recent med list.                Amoxicillin-Pot Clavulanate 875-125 MG Tabs   Take

## (undated) NOTE — MR AVS SNAPSHOT
[facility]  [address] [facility] [address]  [phone]               Thank you for choosing us for your health care visit with Ranulfo Vazquez DO.   We are glad to serve you and happy to provide you with this summary of You can access your MyChart to more actively manage your health care and view more details from this visit by going to [email or web address]   If you've recently had a stay at the Hospital you can access your discharge instructions in [address] by [email or web address]

## (undated) NOTE — MR AVS SNAPSHOT
[address] [facility]  [place] [address]  [phone]               Thank you for choosing us for your health care visit with ANDREE Leonard.   We are glad to serve you and happy to provide you with this In women who have gone through menopause, dysuria can be from dryness in the lining of the urethra. This can be treated with hormones. Dysuria becomes long-term (chronic) when it lasts for weeks or months.  You may need to see a specialist (urologist) to di · You can't urinate because of pain  · New discharge from the urethra, vagina, or penis  · Painful sores on the penis  · Rash or joint pain  · Painful lumps (lymph nodes) in the groin  · Testicle pain or swelling of the scrotum  Date Last Reviewed: 11/1/20 SPECIFIC GRAVITY >=1.030 1.005 - 1.030    OCCULT BLOOD neg Negative    PH, URINE 6.0 4.5 - 8.0    PROTEIN (URINE DIPSTICK) neg Negative/Trace mg/dL    UROBILINOGEN,SEMI-QN 0.2 0.0 - 1.9 mg/dL    NITRITE, URINE neg Negative    LEUKOCYTES neg Negative    AP are inactive.      HOW TO GET STARTED: HOW TO STAY MOTIVATED:   Start activities slowly and build up over time Do what you like   Get your heart pumping – brisk walking, biking, swimming Even 10 minute increments are effective and add up over the week   2 ½

## (undated) NOTE — MR AVS SNAPSHOT
[address] [facility]  [place] [address]  [phone]               Thank you for choosing us for your health care visit with Leeann Bynum PA-C.   We are glad to serve you and happy to provide you with medication may be given. A small piece of skin or pus may be taken to find the type of bacteria causing the infection. Home care  The health care provider may prescribe an antibiotic cream or ointment.  Oral antibiotics may also be prescribed.  Or you may [address]. All rights reserved. This information is not intended as a substitute for professional medical care. Always follow your healthcare professional's instructions.              Allergies as of Jun 07, 2017     No Known Allergies                Today's Vi